# Patient Record
Sex: MALE | Race: BLACK OR AFRICAN AMERICAN
[De-identification: names, ages, dates, MRNs, and addresses within clinical notes are randomized per-mention and may not be internally consistent; named-entity substitution may affect disease eponyms.]

---

## 2019-06-14 ENCOUNTER — HOSPITAL ENCOUNTER (INPATIENT)
Dept: HOSPITAL 11 - JP.ED | Age: 64
LOS: 2 days | Discharge: HOME | DRG: 868 | End: 2019-06-16
Attending: INTERNAL MEDICINE | Admitting: INTERNAL MEDICINE
Payer: MEDICARE

## 2019-06-14 DIAGNOSIS — Z89.421: ICD-10-CM

## 2019-06-14 DIAGNOSIS — H54.7: ICD-10-CM

## 2019-06-14 DIAGNOSIS — G47.30: ICD-10-CM

## 2019-06-14 DIAGNOSIS — E78.00: ICD-10-CM

## 2019-06-14 DIAGNOSIS — E66.9: ICD-10-CM

## 2019-06-14 DIAGNOSIS — K21.9: ICD-10-CM

## 2019-06-14 DIAGNOSIS — A77.49: Primary | ICD-10-CM

## 2019-06-14 DIAGNOSIS — I48.92: ICD-10-CM

## 2019-06-14 DIAGNOSIS — M79.10: ICD-10-CM

## 2019-06-14 DIAGNOSIS — Z86.73: ICD-10-CM

## 2019-06-14 DIAGNOSIS — R53.1: ICD-10-CM

## 2019-06-14 DIAGNOSIS — Z79.82: ICD-10-CM

## 2019-06-14 DIAGNOSIS — I25.10: ICD-10-CM

## 2019-06-14 DIAGNOSIS — Z95.5: ICD-10-CM

## 2019-06-14 DIAGNOSIS — E74.39: ICD-10-CM

## 2019-06-14 DIAGNOSIS — R50.9: ICD-10-CM

## 2019-06-14 DIAGNOSIS — R51: ICD-10-CM

## 2019-06-14 RX ADMIN — INSULIN LISPRO SCH UNIT: 100 INJECTION, SOLUTION INTRAVENOUS; SUBCUTANEOUS at 22:09

## 2019-06-14 NOTE — PCM.HP
H&P History of Present Illness





- General


Date of Service: 06/14/19


Admit Problem/Dx: 


 Admission Diagnosis/Problem





Admission Diagnosis/Problem      Fever








Source of Information: Patient, Provider, RN Notes Reviewed


History Limitations: Reports: No Limitations





- History of Present Illness


Initial Comments - Free Text/Narative: 





Mr. Hill is a 64-year-old gentleman who was admitted through the emergency 

department with weakness headache and myalgias secondary to febrile illness. 

Has not felt well over the past 4 days when symptoms first started. Headache 

actually has improved somewhat over the last 24 hours. He continues to have 

fairly high temperature elevations in the range of 103-104 degrees, with 

persistent myalgias. He denies any other specific symptoms or localization of 

infection. He was camping last weekend and had several bug bites but denies any 

obvious tick bites or finding ticks on himself.





- Related Data


Allergies/Adverse Reactions: 


 Allergies











Allergy/AdvReac Type Severity Reaction Status Date / Time


 


No Known Allergies Allergy   Verified 06/14/19 16:04











Home Medications: 


 Home Meds





Aspirin 81 mg PO DAILY 06/14/19 [History]


Lisinopril 20 mg PO DAILY 06/14/19 [History]


Metoprolol Succinate [Toprol XL] 25 mg PO DAILY 06/14/19 [History]


Nitroglycerin 0.4 mg SL ASDIRECTED 06/14/19 [History]


Ticagrelor [Brilinta] 45 mg PO BID 06/14/19 [History]


atorvaSTATin [Lipitor] 20 mg PO BEDTIME 06/14/19 [History]











Past Medical History


HEENT History: Reports: Impaired Vision


Cardiovascular History: Reports: High Cholesterol, Hypertension, Stents


Respiratory History: Reports: Sleep Apnea, Other (See Below)


Other Respiratory History: CPAP


Musculoskeletal History: Reports: Amputation


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


Neurological History: Reports: TIA


Endocrine/Metabolic History: Reports: Obesity/BMI 30+





- Infectious Disease History


Infectious Disease History: Reports: Chicken Pox, Measles, Mumps





- Past Surgical History


Head Surgeries/Procedures: Reports: None


Cardiovascular Surgical History: Reports: None


Respiratory Surgical History: Reports: None


Endocrine Surgical History: Reports: None


Neurological Surgical History: Reports: None


Musculoskeletal Surgical History: Reports: None


Dermatological Surgical History: Reports: None





Social & Family History





- Tobacco Use


Smoking Status *Q: Never Smoker


Second Hand Smoke Exposure: No





- Caffeine Use


Caffeine Use: Reports: Coffee, Soda, Tea





- Recreational Drug Use


Recreational Drug Use: No





H&P Review of Systems





- Review of Systems:


Review Of Systems: See Below


General: Reports: Fever, Chills, Malaise, Weakness, Fatigue, Diaphoresis, 

Decreased Appetite


HEENT: Reports: Headaches.  Denies: Ear Pain, Eye Pain, Sinus Congestion, Sore 

Throat


Pulmonary: Reports: No Symptoms


Cardiovascular: Reports: No Symptoms


Gastrointestinal: Reports: No Symptoms


Genitourinary: Reports: No Symptoms


Musculoskeletal: Reports: Muscle Pain, Muscle Stiffness.  Denies: Back Pain, 

Joint Pain, Joint Swelling


Skin: Reports: No Symptoms


Psychiatric: Reports: No Symptoms


Neurological: Reports: Headache.  Denies: Confusion, Numbness, Paresthesia, 

Trouble Speaking, Difficulty Walking, Weakness


Hematologic/Lymphatic: Reports: No Symptoms


Immunologic: Reports: No Symptoms





Exam





- Exam


Exam: See Below





- Vital Signs


Vital Signs: 


 Last Vital Signs











Temp  101.4 F H  06/14/19 16:20


 


Pulse  86   06/14/19 17:10


 


Resp  18   06/14/19 17:10


 


BP  129/75   06/14/19 17:10


 


Pulse Ox  95   06/14/19 16:20











Weight: 254 lb 13.67 oz





- Exam


Quality Assessment: DVT Prophylaxis


General: Alert, Oriented, Cooperative, Mild Distress


HEENT: Conjunctiva Clear, Hearing Intact, Normal Nasal Septum, Posterior 

Pharynx Clear, Pupils Equal.  No: Mucosa Moist & Pink


Neck: Supple, Trachea Midline, +2 Carotid Pulse wo Bruit


Lungs: Clear to Auscultation, Normal Respiratory Effort


Cardiovascular: Regular Rate, Normal S1, Normal S2.  No: Systolic Murmur, 

Diastolic Murmur


GI/Abdominal Exam: Soft, Non-Tender, No Organomegaly, No Distention


Back Exam: Normal Inspection, Full Range of Motion


Extremities: Non-Tender, No Pedal Edema


Skin: Warm, Dry, Intact


Neurological: Cranial Nerves Intact, Strength Equal Bilateral, Normal Speech, 

Normal Tone, Sensation Intact.  No: Focal Deficit


Neuro Extensive - Mental Status: Alert, Oriented x3, Normal Mood/Affect, Normal 

Cognition, Memory Intact





- Patient Data


Lab Results Last 24 hrs: 


 Laboratory Results - last 24 hr











  06/14/19 06/14/19 06/14/19 Range/Units





  16:37 17:01 17:03 


 


WBC  7.4    (4.5-11.0)  K/uL


 


RBC  5.86    (4.30-5.90)  M/uL


 


Hgb  17.1 H    (12.0-15.0)  g/dL


 


Hct  51.8    (40.0-54.0)  %


 


MCV  88    (80-98)  fL


 


MCH  29    (27-31)  pg


 


MCHC  33    (32-36)  %


 


Plt Count  143 L    (150-400)  K/uL


 


Neut % (Auto)  89 H    (36-66)  %


 


Lymph % (Auto)  7 L    (24-44)  %


 


Mono % (Auto)  5    (2-6)  %


 


Eos % (Auto)  0 L    (2-4)  %


 


Baso % (Auto)  0    (0-1)  %


 


Sodium   135 L   (140-148)  mmol/L


 


Potassium   4.1   (3.6-5.2)  mmol/L


 


Chloride   101   (100-108)  mmol/L


 


Carbon Dioxide   25   (21-32)  mmol/L


 


Anion Gap   13.1   (5.0-14.0)  mmol/L


 


BUN   24 H   (7-18)  mg/dL


 


Creatinine   2.0 H   (0.8-1.3)  mg/dL


 


Est Cr Clr Drug Dosing   39.22   mL/min


 


Estimated GFR (MDRD)   41 L   (>60)  


 


Glucose   115 H   ()  mg/dL


 


Calcium   9.1   (8.5-10.1)  mg/dL


 


Total Bilirubin   1.6 H   (0.2-1.0)  mg/dL


 


AST   88 H   (15-37)  U/L


 


ALT   56   (12-78)  U/L


 


Alkaline Phosphatase   81   ()  U/L


 


Troponin I   0.037   (0.000-0.056)  ng/mL


 


C-Reactive Protein   6.97 H   (0.0-0.3)  mg/dL


 


Total Protein   7.9   (6.4-8.2)  g/dL


 


Albumin   3.1 L   (3.4-5.0)  g/dL


 


Globulin   4.8 H   (2.3-3.5)  g/dL


 


Albumin/Globulin Ratio   0.7 L   (1.2-2.2)  


 


Procalcitonin    0.69  ng/mL











Result Diagrams: 


 06/14/19 16:37





 06/14/19 17:01





*Q Meaningful Use (ADM)





- VTE Risk Assess *Q


Each Risk Factor Represents 1 Point: Obesity ( BMI > 25 kg/m2)


Total Score 1 Point Risk Factors: 1


Each Risk Factor Represents 2 Points: Age 60 - 74 Years


Total Score 2 Point Risk Factors: 2


Each Risk Factor Represents 3 Points: None


Total Score 3 Point Risk Factors: 0


Each Risk Factor Represents 5 Points: None


Total Score 5 Point Risk Factors: 0


Venous Thromboembolism Risk Factor Score *Q: 3


Problem List Initiated/Reviewed/Updated: Yes


Orders Last 24hrs: 


 Active Orders 24 hr











 Category Date Time Status


 


 Patient Status Manage Transfer [TRANSFER] Routine ADT  06/14/19 19:03 Active


 


 EKG Documentation Completion [RC] ASDIRECTED Care  06/14/19 16:37 Active


 


 CULTURE BLOOD [BC] Urgent Lab  06/14/19 16:45 Received


 


 CULTURE BLOOD [BC] Urgent Lab  06/14/19 16:50 Received


 


 HUMAN GRANULOCYTIC DAILY-HGE Urgent Lab  06/14/19 17:46 Received


 


 LYME, TOTAL AB TEST/REFLEX Routine Lab  06/14/19 17:46 Received


 


 UA W/MICROSCOPIC [URIN] Urgent Lab  06/14/19 17:38 Ordered


 


 Sodium Chloride 0.9% [Normal Saline] 1,000 ml Med  06/14/19 17:00 Active





 IV ASDIRECTED   


 


 Blood Culture x2 Reflex Set [OM.PC] Urgent Oth  06/14/19 16:37 Ordered


 


 Resuscitation Status Routine Resus Stat  06/14/19 19:05 Ordered


 


 EKG 12 Lead [EK] Routine Ther  06/14/19 16:37 Ordered








 Medication Orders





Sodium Chloride (Normal Saline)  1,000 mls @ 1,000 mls/hr IV ASDIRECTED VINEET


   Last Admin: 06/14/19 17:21  Dose: 1,000 mls/hr








Assessment/Plan Comment:: 





ASSESSMENT AND PLAN





FEBRILE ILLNESS-present for the past 4 days, no specific etiology identified on 

evaluation in the emergency department. Possible viral syndrome versus 

anaplasmosis. He was in the woods last weekend, but does not recall obvious 

tick bite. Headache has improved but he continues to experience significant 

myalgias as well as fever.


-Reassess labs in a.m.


-Blood cultures pending


-Urinalysis pending


-Tick serology pending


-IV antibiotic therapy; Zosyn and doxycycline, pending further results





ATRIAL FLUTTER WITH CONTROLLED VENTRICULAR RESPONSE-he denies any previous 

history of significant dysrhythmia


-Cardiac monitoring





HISTORY OF CORONARY ARTERY DISEASE-currently asymptomatic





HISTORY OF GLUCOSE INTOLERANCE


-4 times a day glucometers


-Low-dose sliding scale Humulog





MAINTENANCE ISSUES


-DVT prophylaxis; Lovenox 40 mg subcutaneous daily


-GI prophylaxis; not indicated


-Christina catheter; not indicated


-Nutrition; regular diet


-Nicotine dependence; not required





CODE STATUS-FULL CODE





ADMISSION STATUS-patient will be admitted to inpatient status, expect at least 

a 2 night hospital stay for evaluation and management of problems as outlined 

above.  At the time of this admission I do not reasonably expected evaluation 

and management of this problem will require more than a 96 hour hospital stay.





DISPOSITION-anticipate discharge to home after the hospital stay.





PRIMARY CARE PROVIDER-

## 2019-06-14 NOTE — CRLCR
INDICATION: Dyspnea



TECHNIQUE: Chest radiograph 2 views



COMPARISON: None



FINDINGS: 



Mediastinum: The mediastinum is normal in appearance. The heart silhouette 

is normal in size and morphology. 



Lung: Both lungs are unremarkable in appearance. No sign of pleural 

effusion seen. No pneumothorax is identified. 







IMPRESSION: 



1. No acute cardiopulmonary disease is seen. 



Dictated by: Valentin Casas MD @ 06/14/2019 18:12:04



(Electronically Signed)

## 2019-06-14 NOTE — EDM.PDOC
ED HPI GENERAL MEDICAL PROBLEM





- General


Chief Complaint: Neurological Problem


Stated Complaint: LOSS OF BALANCE


Time Seen by Provider: 06/14/19 16:25


Source of Information: Reports: Patient, Family


History Limitations: Reports: No Limitations





- History of Present Illness


INITIAL COMMENTS - FREE TEXT/NARRATIVE: 





64-year-old -American with fever, generalized malaise, headache 

intermittent, and weakness over the past 5-7 days. He is very nonspecific about 

his symptoms, just feels off balance and weak. One week ago they were camping 

in primitive conditions without running water. He does not remember a tick 

bite. He has generalized muscle aches but not significant, no joint pains, no 

erythema or rash that is new. He was being treated for a rash on his right hip 

by dermatology over the last several weeks. No cough or shortness of breath, 

denies a sore throat, his stomach is "a little upset" but no pain, no vomiting.


Onset: Gradual


Duration: Day(s): (Symptoms have been increasing over the last 7 days.)


Associated Symptoms: Reports: Headaches, Malaise, Weakness.  Denies: Confusion, 

Cough


Treatments PTA: Reports: Aspirin (Takes a low-dose aspirin daily)





- Related Data


 Allergies











Allergy/AdvReac Type Severity Reaction Status Date / Time


 


No Known Allergies Allergy   Verified 06/14/19 19:58











Home Meds: 


 Home Meds





Aspirin 81 mg PO DAILY 06/14/19 [History]


Lisinopril 20 mg PO DAILY 06/14/19 [History]


Nitroglycerin 0.4 mg SL ASDIRECTED 06/14/19 [History]


Ticagrelor [Brilinta] 90 mg PO BID 06/14/19 [History]


atorvaSTATin [Lipitor] 40 mg PO DAILY 06/14/19 [History]


Metoprolol Tartrate 12.5 mg PO BID 06/15/19 [History]


Triamcinolone Acetonide [Triamcinolone Acetonide 0.5%] 15 gm .XX BID 06/15/19 [

History]


amLODIPine [Norvasc] 10 mg PO DAILY 06/15/19 [History]











Past Medical History


HEENT History: Reports: Impaired Vision


Cardiovascular History: Reports: High Cholesterol, Hypertension, Stents


Respiratory History: Reports: Sleep Apnea, Other (See Below)


Other Respiratory History: CPAP


Musculoskeletal History: Reports: Amputation


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


Neurological History: Reports: TIA


Endocrine/Metabolic History: Reports: Obesity/BMI 30+





- Infectious Disease History


Infectious Disease History: Reports: Chicken Pox, Measles, Mumps





- Past Surgical History


Head Surgeries/Procedures: Reports: None


Cardiovascular Surgical History: Reports: None


Respiratory Surgical History: Reports: None


Endocrine Surgical History: Reports: None


Neurological Surgical History: Reports: None


Musculoskeletal Surgical History: Reports: None


Dermatological Surgical History: Reports: None





Social & Family History





- Tobacco Use


Smoking Status *Q: Never Smoker


Second Hand Smoke Exposure: No





- Caffeine Use


Caffeine Use: Reports: Coffee, Soda, Tea





- Recreational Drug Use


Recreational Drug Use: No





ED ROS GENERAL





- Review of Systems


Review Of Systems: See Below


Constitutional: Reports: Fever, Chills, Malaise, Weakness, Decreased Appetite


HEENT: Denies: Sinus Problem, Throat Pain, Vision Change


Respiratory: Denies: Shortness of Breath, Cough


Cardiovascular: Reports: Lightheadedness.  Denies: Chest Pain, Palpitations


GI/Abdominal: Reports: Decreased Appetite, Nausea.  Denies: Abdominal Pain, 

Diarrhea, Vomiting


: Reports: No Symptoms


Musculoskeletal: Reports: Muscle Pain (Mild diffuse muscle aches, no focal pain)


Skin: Reports: Other (Melanotic macular rash on the right hip)


Neurological: Reports: Dizziness, Headache


Psychiatric: Reports: No Symptoms





ED EXAM, GENERAL





- Physical Exam


Exam: See Below


Exam Limited By: No Limitations


General Appearance: Alert, No Apparent Distress, Other (Patient appears tired 

but not significantly ill. Mental status is normal, answering questions 

appropriately)


Eye Exam: Right Eye: Foreign Body, Bilateral Eye: EOMI


Throat/Mouth: Normal Inspection


Head: Atraumatic


Neck: Supple, Other (Mild soreness with range of motion but no focal tenderness 

or meningismus)


Respiratory/Chest: No Respiratory Distress, Lungs Clear.  No: Rhonchi, Wheezing


Cardiovascular: Regular Rate, Rhythm


GI/Abdominal: Soft, Non-Tender, No Mass


Extremities: Normal Inspection.  No: Pedal Edema


Neurological: Alert, Oriented, No Motor/Sensory Deficits


Psychiatric: Normal Affect, Normal Mood


Skin Exam: Warm, Dry, Other (An area of hyper melanotic "rash" on the right 

lateral hip)





Course





- Vital Signs


Last Recorded V/S: 


 Last Vital Signs











Temp  95.9 F   06/16/19 07:00


 


Pulse  88   06/16/19 07:00


 


Resp  16   06/16/19 07:00


 


BP  98/76   06/16/19 07:00


 


Pulse Ox  98   06/16/19 07:00














- Orders/Labs/Meds


Orders: 


 Medication Orders





Acetaminophen (Tylenol)  650 mg PO Q4H PRN


   PRN Reason: Pain (Mild 1-3)/fever


Aspirin (Aspirin)  81 mg PO DAILY [facility]


   Last Admin: 06/15/19 09:44  Dose: 81 mg


Atorvastatin Calcium (Lipitor)  40 mg PO DAILY [facility]


   Last Admin: 06/15/19 10:02  Dose: 40 mg


Calcium Carbonate/Glycine (Tums)  1,000 mg PO Q2H PRN


   PRN Reason: Indigestion


   Last Admin: 06/15/19 23:12  Dose: 1,000 mg


Dextrose (Glutose 15)  15 gm PO ONETIME PRN


   PRN Reason: Hypoglycemia


Dextrose/Water (Dextrose 50% In Water)  50 ml IV ONETIME PRN


   PRN Reason: Hypoglycemia


Enoxaparin Sodium (Lovenox)  40 mg SUBCUT BEDTIME [facility]


   Last Admin: 06/15/19 21:20  Dose: 40 mg


Doxycycline Hyclate 100 mg/ (Sodium Chloride)  100 mls @ 100 mls/hr IV Q12H [facility]


   Last Admin: 06/15/19 21:20  Dose: 100 mls/hr


   Admin: 06/15/19 09:46  Dose: 100 mls/hr


Insulin Human Lispro (Humalog)  0 unit SUBCUT QIDACANDBED [facility]; Protocol


   Last Admin: 06/15/19 21:20 Dose:  Not Given


   Admin: 06/15/19 17:17 Dose:  Not Given


   Admin: 06/15/19 12:47 Dose:  Not Given


   Admin: 06/15/19 07:57 Dose:  Not Given


   Admin: 06/14/19 22:09  Dose: 1 unit


Lisinopril (Prinivil)  20 mg PO DAILY [facility]


   Last Admin: 06/15/19 09:44  Dose: 20 mg


Lorazepam (Ativan)  0.5 mg IVPUSH Q2H PRN


   PRN Reason: Nausea


   Last Admin: 06/15/19 21:36  Dose: 0.5 mg


   Admin: 06/15/19 06:36  Dose: 0.5 mg


   Admin: 06/15/19 00:05  Dose: 0.5 mg


Magnesium Oxide (Magnesium Oxide)  400 mg PO BID [facility]


   Last Admin: 06/15/19 21:21  Dose: 400 mg


   Admin: 06/15/19 09:45  Dose: 400 mg


Metoprolol Tartrate (Lopressor)  12.5 mg PO BID [facility]


   Last Admin: 06/15/19 21:21  Dose: 12.5 mg


   Admin: 06/15/19 10:01  Dose: 12.5 mg


Ondansetron HCl (Zofran)  4 mg IV Q4H PRN


   PRN Reason: Nausea/Vomiting


   Last Admin: 06/14/19 22:14  Dose: 4 mg


Oxycodone HCl (Oxycodone)  10 mg PO Q4H PRN


   PRN Reason: Pain (moderate 4-6)


Polyethylene Glycol (Miralax)  17 gm PO DAILY PRN


   PRN Reason: Constipation


Sodium Chloride (Saline Flush)  10 ml FLUSH ASDIRECTED PRN


   PRN Reason: Keep Vein Open


Ticagrelor (Brilinta)  90 mg PO BID [facility]


   Last Admin: 06/15/19 21:20  Dose: 90 mg


   Admin: 06/15/19 10:01  Dose: 90 mg








Labs: 


 Laboratory Tests











  06/14/19 06/14/19 06/14/19 Range/Units





  16:37 17:01 17:03 


 


WBC  7.4    (4.5-11.0)  K/uL


 


RBC  5.86    (4.30-5.90)  M/uL


 


Hgb  17.1 H    (12.0-15.0)  g/dL


 


Hct  51.8    (40.0-54.0)  %


 


MCV  88    (80-98)  fL


 


MCH  29    (27-31)  pg


 


MCHC  33    (32-36)  %


 


Plt Count  143 L    (150-400)  K/uL


 


Neut % (Auto)  89 H    (36-66)  %


 


Lymph % (Auto)  7 L    (24-44)  %


 


Mono % (Auto)  5    (2-6)  %


 


Eos % (Auto)  0 L    (2-4)  %


 


Baso % (Auto)  0    (0-1)  %


 


Sodium   135 L   (140-148)  mmol/L


 


Potassium   4.1   (3.6-5.2)  mmol/L


 


Chloride   101   (100-108)  mmol/L


 


Carbon Dioxide   25   (21-32)  mmol/L


 


Anion Gap   13.1   (5.0-14.0)  mmol/L


 


BUN   24 H   (7-18)  mg/dL


 


Creatinine   2.0 H   (0.8-1.3)  mg/dL


 


Est Cr Clr Drug Dosing   39.22   mL/min


 


Estimated GFR (MDRD)   41 L   (>60)  


 


Glucose   115 H   ()  mg/dL


 


Calcium   9.1   (8.5-10.1)  mg/dL


 


Total Bilirubin   1.6 H   (0.2-1.0)  mg/dL


 


AST   88 H   (15-37)  U/L


 


ALT   56   (12-78)  U/L


 


Alkaline Phosphatase   81   ()  U/L


 


Troponin I   0.037   (0.000-0.056)  ng/mL


 


C-Reactive Protein   6.97 H   (0.0-0.3)  mg/dL


 


Total Protein   7.9   (6.4-8.2)  g/dL


 


Albumin   3.1 L   (3.4-5.0)  g/dL


 


Globulin   4.8 H   (2.3-3.5)  g/dL


 


Albumin/Globulin Ratio   0.7 L   (1.2-2.2)  


 


Procalcitonin    0.69  ng/mL











Meds: 


Medications











Generic Name Dose Route Start Last Admin





  Trade Name Freq  PRN Reason Stop Dose Admin


 


Acetaminophen  650 mg  06/14/19 19:39  





  Tylenol  PO   





  Q4H PRN   





  Pain (Mild 1-3)/fever   





     





     





     


 


Aspirin  81 mg  06/15/19 09:00  06/15/19 09:44





  Aspirin  PO   81 mg





  DAILY [facility]   Administration





     





     





     





     


 


Atorvastatin Calcium  40 mg  06/15/19 09:30  06/15/19 10:02





  Lipitor  PO   40 mg





  DAILY [facility]   Administration





     





     





     





     


 


Calcium Carbonate/Glycine  1,000 mg  06/15/19 22:58  06/15/19 23:12





  Tums  PO   1,000 mg





  Q2H PRN   Administration





  Indigestion   





     





     





     


 


Dextrose  15 gm  06/14/19 19:39  





  Glutose 15  PO   





  ONETIME PRN   





  Hypoglycemia   





     





     





     


 


Dextrose/Water  50 ml  06/14/19 19:39  





  Dextrose 50% In Water  IV   





  ONETIME PRN   





  Hypoglycemia   





     





     





     


 


Enoxaparin Sodium  40 mg  06/15/19 21:00  06/15/19 21:20





  Lovenox  SUBCUT   40 mg





  BEDTIME [facility]   Administration





     





     





     





     


 


Doxycycline Hyclate 100 mg/  100 mls @ 100 mls/hr  06/15/19 09:00  06/15/19 21:

20





  Sodium Chloride  IV   100 mls/hr





  Q12H [facility]   Administration





     





     





     





     


 


Insulin Human Lispro  0 unit  06/14/19 20:00  06/15/19 21:20





  Humalog  SUBCUT   Not Given





  QIDACANDBED [facility]   





     





     





  Protocol   





     


 


Lisinopril  20 mg  06/15/19 09:00  06/15/19 09:44





  Prinivil  PO   20 mg





  DAILY [facility]   Administration





     





     





     





     


 


Lorazepam  0.5 mg  06/14/19 23:42  06/15/19 21:36





  Ativan  IVPUSH   0.5 mg





  Q2H PRN   Administration





  Nausea   





     





     





     


 


Magnesium Oxide  400 mg  06/15/19 09:00  06/15/19 21:21





  Magnesium Oxide  PO   400 mg





  BID [facility]   Administration





     





     





     





     


 


Metoprolol Tartrate  12.5 mg  06/15/19 09:30  06/15/19 21:21





  Lopressor  PO   12.5 mg





  BID [facility]   Administration





     





     





     





     


 


Ondansetron HCl  4 mg  06/14/19 19:39  06/14/19 22:14





  Zofran  IV   4 mg





  Q4H PRN   Administration





  Nausea/Vomiting   





     





     





     


 


Oxycodone HCl  10 mg  06/14/19 19:39  





  Oxycodone  PO   





  Q4H PRN   





  Pain (moderate 4-6)   





     





     





     


 


Polyethylene Glycol  17 gm  06/14/19 19:39  





  Miralax  PO   





  DAILY PRN   





  Constipation   





     





     





     


 


Sodium Chloride  10 ml  06/14/19 19:39  





  Saline Flush  FLUSH   





  ASDIRECTED PRN   





  Keep Vein Open   





     





     





     


 


Ticagrelor  90 mg  06/15/19 09:30  06/15/19 21:20





  Brilinta  PO   90 mg





  BID [facility]   Administration





     





     





     





     














Discontinued Medications














Generic Name Dose Route Start Last Admin





  Trade Name Freq  PRN Reason Stop Dose Admin


 


Acetaminophen  1,000 mg  06/14/19 16:38  06/14/19 16:42





  Tylenol Extra Strength  PO  06/14/19 16:39  1,000 mg





  ONETIME ONE   Administration





     





     





     





     


 


Atorvastatin Calcium  20 mg  06/14/19 21:00  06/14/19 22:20





  Lipitor  PO   Not Given





  BEDTIME [facility]   





     





     





     





     


 


Enoxaparin Sodium  40 mg  06/14/19 19:39  06/14/19 22:09





  Lovenox  SUBCUT   40 mg





  DAILY [facility]   Administration





     





     





     





     


 


Sodium Chloride  1,000 mls @ 1,000 mls/hr  06/14/19 17:00  06/14/19 17:21





  Normal Saline  IV   1,000 mls/hr





  ASDIRECTED [facility]   Administration





     





     





     





     


 


Doxycycline Hyclate 100 mg/  100 mls @ 100 mls/hr  06/14/19 17:34  06/14/19 18:

17





  Sodium Chloride  IV  06/14/19 18:33  100 mls/hr





  ONETIME ONE   Administration





     





     





     





     


 


Piperacillin Sod/Tazobactam  100 mls @ 100 mls/hr  06/14/19 17:33  06/14/19 18:

14





  Sod 4.5 gm/ Sodium Chloride  IV  06/14/19 18:32  100 mls/hr





  ONETIME ONE   Administration





     





     





     





     


 


Lactated Ringer's  1,000 mls @ 125 mls/hr  06/14/19 19:39  06/15/19 05:35





  Ringers, Lactated  IV   125 mls/hr





  ASDIRECTED [facility]   Administration





     





     





     





     


 


Piperacillin Sod/Tazobactam  50 mls @ 100 mls/hr  06/15/19 00:00  06/15/19 05:35





  Sod 2.25 gm/ Sodium Chloride  IV   100 mls/hr





  Q6H [facility]   Administration





     





     





     





     


 


Piperacillin/Tazobactam/  50 mls @ 100 mls/hr  06/15/19 12:00  06/15/19 17:16





  Dextrose 3.375 gm/ Premix  IV   100 mls/hr





  Q6H [facility]   Administration





     





     





     





     


 


Magnesium Sulfate 2 gm/ Premix  50 mls @ 25 mls/hr  06/15/19 09:00  06/15/19 09:

48





  IV  06/15/19 10:59  25 mls/hr





  ONETIME ONE   Administration





     





     





     





     


 


Metoprolol Succinate  25 mg  06/15/19 09:00  





  Toprol Xl  PO   





  DAILY [facility]   





     





     





     





     


 


Metoprolol Succinate  25 mg  06/14/19 21:45  06/14/19 22:12





  Toprol Xl  PO   25 mg





  BID [facility]   Administration





     





     





     





     


 


Ondansetron HCl  4 mg  06/14/19 18:04  06/14/19 18:13





  Zofran  IVPUSH  06/14/19 18:05  4 mg





  ONETIME ONE   Administration





     





     





     





     


 


Ticagrelor  45 mg  06/14/19 21:00  06/14/19 22:11





  Brilinta  PO   45 mg





  BID [facility]   Administration





     





     





     





     














- Re-Assessments/Exams


Free Text/Narrative Re-Assessment/Exam: 





06/14/19 18:00


An IV was started and the patient was given normal saline 1 L bolus. CBC, CMP, 

CRP and pro-calcitonin were obtained as well as blood cultures. An EKG 

confirmed atrial flutter. A troponin was also drawn. Two-view chest x-ray and 

UA was obtained.


06/14/19 18:11


CBC returned with a white count is 7400, hemoglobin 17.1 platelet count 143,000 

and neutrophil percent was 89%. Sodium 135 potassium 4.1 carbon dioxide 25 and 

gap 13.1 BUN and creatinine were elevated at 24 and 2.0 is actively. GFR was 

also low at 41. Patient has no history of renal disease that he knows of. Total 

bilirubin was elevated at 1.6 and AST was mildly elevated at 88, ALT and 

alkaline phosphatase were normal. Troponin 0.037. CRP was significantly 

elevated at 6.97, pro-calcitonin also elevated at 0.69. Patient presents with 

infection of unknown etiology, two-view chest x-ray obtained was negative. 

After a liter of fluid the patient was still having difficulty obtaining a 

urine so an additional liter was given. Discussed his case with Dr. Bowie of 

the hospitalist service, patient was given 4.5 g of Zosyn and 100 mg of IV 

doxycycline, Lyme's disease and ehrlichiosis panels were added to the workup. 

Patient developed some nausea and was given 4 mg of Zofran.





Departure





- Departure


Time of Disposition: 18:50


Disposition: Admitted As Inpatient 65


Clinical Impression: 


Fever


Qualifiers:


 Fever type: unspecified Qualified Code(s): R50.9 - Fever, unspecified








- Discharge Information

## 2019-06-15 RX ADMIN — INSULIN LISPRO SCH: 100 INJECTION, SOLUTION INTRAVENOUS; SUBCUTANEOUS at 07:57

## 2019-06-15 RX ADMIN — LORAZEPAM PRN MG: 2 INJECTION INTRAMUSCULAR; INTRAVENOUS at 21:36

## 2019-06-15 RX ADMIN — TAZOBACTAM SODIUM AND PIPERACILLIN SODIUM SCH MLS/HR: 375; 3 INJECTION, SOLUTION INTRAVENOUS at 12:15

## 2019-06-15 RX ADMIN — LORAZEPAM PRN MG: 2 INJECTION INTRAMUSCULAR; INTRAVENOUS at 00:05

## 2019-06-15 RX ADMIN — INSULIN LISPRO SCH: 100 INJECTION, SOLUTION INTRAVENOUS; SUBCUTANEOUS at 12:47

## 2019-06-15 RX ADMIN — INSULIN LISPRO SCH: 100 INJECTION, SOLUTION INTRAVENOUS; SUBCUTANEOUS at 21:20

## 2019-06-15 RX ADMIN — INSULIN LISPRO SCH: 100 INJECTION, SOLUTION INTRAVENOUS; SUBCUTANEOUS at 17:17

## 2019-06-15 RX ADMIN — TAZOBACTAM SODIUM AND PIPERACILLIN SODIUM SCH MLS/HR: 375; 3 INJECTION, SOLUTION INTRAVENOUS at 17:16

## 2019-06-15 RX ADMIN — LORAZEPAM PRN MG: 2 INJECTION INTRAMUSCULAR; INTRAVENOUS at 06:36

## 2019-06-15 NOTE — PCM.PN
- General Info


Date of Service: 06/15/19


Subjective Update: 





Mr. Hill has noted good improvement in symptoms since admission. Headache 

is totally resolved and he's not had much in the way of fevers. He did 

experience nausea and vomiting last night but that also appears to be 

significantly improved, he was able to tolerate his breakfast without 

significant symptoms. Energy level and appetite seem to be improved as well. 


Functional Status: Reports: Tolerating Diet, Ambulating, Urinating





- Review of Systems


General: Reports: Fever, Weakness, Chills


Pulmonary: Reports: No Symptoms


Cardiovascular: Reports: No Symptoms


Gastrointestinal: Reports: No Symptoms





- Patient Data


Vitals - Most Recent: 


 Last Vital Signs











Temp  97.4 F   06/15/19 14:45


 


Pulse  69   06/15/19 14:45


 


Resp  16   06/15/19 14:45


 


BP  117/75   06/15/19 14:45


 


Pulse Ox  98   06/15/19 14:45











Weight - Most Recent: 255 lb 3.2 oz


I&O - Last 24 Hours: 


 Intake & Output











 06/14/19 06/15/19 06/15/19





 22:59 06:59 14:59


 


Intake Total 300 1424 1260


 


Output Total  500 


 


Balance 











Lab Results Last 24 Hours: 


 Laboratory Results - last 24 hr











  06/14/19 06/14/19 06/14/19 Range/Units





  16:37 17:01 17:03 


 


WBC  7.4    (4.5-11.0)  K/uL


 


RBC  5.86    (4.30-5.90)  M/uL


 


Hgb  17.1 H    (12.0-15.0)  g/dL


 


Hct  51.8    (40.0-54.0)  %


 


MCV  88    (80-98)  fL


 


MCH  29    (27-31)  pg


 


MCHC  33    (32-36)  %


 


Plt Count  143 L    (150-400)  K/uL


 


Neut % (Auto)  89 H    (36-66)  %


 


Lymph % (Auto)  7 L    (24-44)  %


 


Mono % (Auto)  5    (2-6)  %


 


Eos % (Auto)  0 L    (2-4)  %


 


Baso % (Auto)  0    (0-1)  %


 


Sodium   135 L   (140-148)  mmol/L


 


Potassium   4.1   (3.6-5.2)  mmol/L


 


Chloride   101   (100-108)  mmol/L


 


Carbon Dioxide   25   (21-32)  mmol/L


 


Anion Gap   13.1   (5.0-14.0)  mmol/L


 


BUN   24 H   (7-18)  mg/dL


 


Creatinine   2.0 H   (0.8-1.3)  mg/dL


 


Est Cr Clr Drug Dosing   39.22   mL/min


 


Estimated GFR (MDRD)   41 L   (>60)  


 


Glucose   115 H   ()  mg/dL


 


Calcium   9.1   (8.5-10.1)  mg/dL


 


Magnesium     (1.8-2.4)  mg/dL


 


Total Bilirubin   1.6 H   (0.2-1.0)  mg/dL


 


AST   88 H   (15-37)  U/L


 


ALT   56   (12-78)  U/L


 


Alkaline Phosphatase   81   ()  U/L


 


Troponin I   0.037   (0.000-0.056)  ng/mL


 


C-Reactive Protein   6.97 H   (0.0-0.3)  mg/dL


 


Total Protein   7.9   (6.4-8.2)  g/dL


 


Albumin   3.1 L   (3.4-5.0)  g/dL


 


Globulin   4.8 H   (2.3-3.5)  g/dL


 


Albumin/Globulin Ratio   0.7 L   (1.2-2.2)  


 


Procalcitonin    0.69  ng/mL


 


Urine Color     


 


Urine Appearance     


 


Urine pH     (4.5-8.0)  


 


Ur Specific Gravity     (1.008-1.030)  


 


Urine Protein     (NEGATIVE)  mg/dL


 


Urine Glucose (UA)     (NEGATIVE)  mg/dL


 


Urine Ketones     (NEGATIVE)  mg/dL


 


Urine Occult Blood     (NEGATIVE)  


 


Urine Nitrite     (NEGAITVE)  


 


Urine Bilirubin     (NEGATIVE)  


 


Urine Urobilinogen     (NORMAL)  mg/dL


 


Ur Leukocyte Esterase     (NEGATIVE)  














  06/14/19 06/15/19 06/15/19 Range/Units





  22:26 05:49 05:49 


 


WBC   5.8   (4.5-11.0)  K/uL


 


RBC   5.26   (4.30-5.90)  M/uL


 


Hgb   15.5 H   (12.0-15.0)  g/dL


 


Hct   46.9   (40.0-54.0)  %


 


MCV   89   (80-98)  fL


 


MCH   30   (27-31)  pg


 


MCHC   33   (32-36)  %


 


Plt Count   108 L   (150-400)  K/uL


 


Neut % (Auto)   82 H   (36-66)  %


 


Lymph % (Auto)   11 L   (24-44)  %


 


Mono % (Auto)   7 H   (2-6)  %


 


Eos % (Auto)   0 L   (2-4)  %


 


Baso % (Auto)   0   (0-1)  %


 


Sodium    137 L  (140-148)  mmol/L


 


Potassium    3.9  (3.6-5.2)  mmol/L


 


Chloride    104  (100-108)  mmol/L


 


Carbon Dioxide    25  (21-32)  mmol/L


 


Anion Gap    11.9  (5.0-14.0)  mmol/L


 


BUN    25 H  (7-18)  mg/dL


 


Creatinine    1.8 H  (0.8-1.3)  mg/dL


 


Est Cr Clr Drug Dosing    43.58  mL/min


 


Estimated GFR (MDRD)    46 L  (>60)  


 


Glucose    109 H  ()  mg/dL


 


Calcium    8.6  (8.5-10.1)  mg/dL


 


Magnesium    1.7 L  (1.8-2.4)  mg/dL


 


Total Bilirubin    1.3 H  (0.2-1.0)  mg/dL


 


AST    112 H  (15-37)  U/L


 


ALT    69  (12-78)  U/L


 


Alkaline Phosphatase    67  ()  U/L


 


Troponin I     (0.000-0.056)  ng/mL


 


C-Reactive Protein     (0.0-0.3)  mg/dL


 


Total Protein    6.7  (6.4-8.2)  g/dL


 


Albumin    2.5 L  (3.4-5.0)  g/dL


 


Globulin    4.2 H  (2.3-3.5)  g/dL


 


Albumin/Globulin Ratio    0.6 L  (1.2-2.2)  


 


Procalcitonin     ng/mL


 


Urine Color      


 


Urine Appearance  Cloudy    


 


Urine pH  5.0    (4.5-8.0)  


 


Ur Specific Gravity  1.020    (1.008-1.030)  


 


Urine Protein  Negative    (NEGATIVE)  mg/dL


 


Urine Glucose (UA)  Normal    (NEGATIVE)  mg/dL


 


Urine Ketones  Negative    (NEGATIVE)  mg/dL


 


Urine Occult Blood  Moderate    (NEGATIVE)  


 


Urine Nitrite  Positive H    (NEGAITVE)  


 


Urine Bilirubin  Negative    (NEGATIVE)  


 


Urine Urobilinogen  Normal    (NORMAL)  mg/dL


 


Ur Leukocyte Esterase  Large    (NEGATIVE)  











[facility] Results Last 24 Hours: 


 Microbiology











 06/15/19 12:18 Clostridioides difficile (PCR) - Final





 Stool / Feces    NEGATIVE CDIFF TOXIN











Med Orders - Current: 


 Current Medications





Acetaminophen (Tylenol)  650 mg PO Q4H PRN


   PRN Reason: Pain (Mild 1-3)/fever


Aspirin (Aspirin)  81 mg PO DAILY [facility]


   Last Admin: 06/15/19 09:44 Dose:  81 mg


Atorvastatin Calcium (Lipitor)  40 mg PO DAILY [facility]


   Last Admin: 06/15/19 10:02 Dose:  40 mg


Dextrose (Glutose 15)  15 gm PO ONETIME PRN


   PRN Reason: Hypoglycemia


Dextrose/Water (Dextrose 50% In Water)  50 ml IV ONETIME PRN


   PRN Reason: Hypoglycemia


Enoxaparin Sodium (Lovenox)  40 mg SUBCUT BEDTIME [facility]


Doxycycline Hyclate 100 mg/ (Sodium Chloride)  100 mls @ 100 mls/hr IV Q12H [facility]


   Last Admin: 06/15/19 09:46 Dose:  100 mls/hr


Piperacillin/Tazobactam/ (Dextrose 3.375 gm/ Premix)  50 mls @ 100 mls/hr IV 

Q6H [facility]


   Last Admin: 06/15/19 12:15 Dose:  100 mls/hr


Insulin Human Lispro (Humalog)  0 unit SUBCUT QIDACANDBED [facility]; Protocol


   Last Admin: 06/15/19 12:47 Dose:  Not Given


Lisinopril (Prinivil)  20 mg PO DAILY [facility]


   Last Admin: 06/15/19 09:44 Dose:  20 mg


Lorazepam (Ativan)  0.5 mg IVPUSH Q2H PRN


   PRN Reason: Nausea


   Last Admin: 06/15/19 06:36 Dose:  0.5 mg


Magnesium Oxide (Magnesium Oxide)  400 mg PO BID [facility]


   Last Admin: 06/15/19 09:45 Dose:  400 mg


Metoprolol Tartrate (Lopressor)  12.5 mg PO BID [facility]


   Last Admin: 06/15/19 10:01 Dose:  12.5 mg


Ondansetron HCl (Zofran)  4 mg IV Q4H PRN


   PRN Reason: Nausea/Vomiting


   Last Admin: 06/14/19 22:14 Dose:  4 mg


Oxycodone HCl (Oxycodone)  10 mg PO Q4H PRN


   PRN Reason: Pain (moderate 4-6)


Polyethylene Glycol (Miralax)  17 gm PO DAILY PRN


   PRN Reason: Constipation


Sodium Chloride (Saline Flush)  10 ml FLUSH ASDIRECTED PRN


   PRN Reason: Keep Vein Open


Ticagrelor (Brilinta)  90 mg PO BID [facility]


   Last Admin: 06/15/19 10:01 Dose:  90 mg





Discontinued Medications





Acetaminophen (Tylenol Extra Strength)  1,000 mg PO ONETIME ONE


   Stop: 06/14/19 16:39


   Last Admin: 06/14/19 16:42 Dose:  1,000 mg


Atorvastatin Calcium (Lipitor)  20 mg PO BEDTIME [facility]


   Last Admin: 06/14/19 22:20 Dose:  Not Given


Enoxaparin Sodium (Lovenox)  40 mg SUBCUT DAILY [facility]


   Last Admin: 06/14/19 22:09 Dose:  40 mg


Sodium Chloride (Normal Saline)  1,000 mls @ 1,000 mls/hr IV ASDIRECTED [facility]


   Last Admin: 06/14/19 17:21 Dose:  1,000 mls/hr


Doxycycline Hyclate 100 mg/ (Sodium Chloride)  100 mls @ 100 mls/hr IV ONETIME 

ONE


   Stop: 06/14/19 18:33


   Last Admin: 06/14/19 18:17 Dose:  100 mls/hr


Piperacillin Sod/Tazobactam (Sod 4.5 gm/ Sodium Chloride)  100 mls @ 100 mls/hr 

IV ONETIME ONE


   Stop: 06/14/19 18:32


   Last Admin: 06/14/19 18:14 Dose:  100 mls/hr


Lactated Ringer's (Ringers, Lactated)  1,000 mls @ 125 mls/hr IV ASDIRECTED [facility]


   Last Admin: 06/15/19 05:35 Dose:  125 mls/hr


Piperacillin Sod/Tazobactam (Sod 2.25 gm/ Sodium Chloride)  50 mls @ 100 mls/hr 

IV Q6H [facility]


   Last Admin: 06/15/19 05:35 Dose:  100 mls/hr


Magnesium Sulfate 2 gm/ Premix  50 mls @ 25 mls/hr IV ONETIME ONE


   Stop: 06/15/19 10:59


   Last Admin: 06/15/19 09:48 Dose:  25 mls/hr


Metoprolol Succinate (Toprol Xl)  25 mg PO DAILY [facility]


Metoprolol Succinate (Toprol Xl)  25 mg PO BID [facility]


   Last Admin: 06/14/19 22:12 Dose:  25 mg


Ondansetron HCl (Zofran)  4 mg IVPUSH ONETIME ONE


   Stop: 06/14/19 18:05


   Last Admin: 06/14/19 18:13 Dose:  4 mg


Ticagrelor (Brilinta)  45 mg PO BID [facility]


   Last Admin: 06/14/19 22:11 Dose:  45 mg











- Exam


Quality Assessment: DVT Prophylaxis


General: Alert, Oriented, Cooperative, Mild Distress


Lungs: Clear to Auscultation, Normal Respiratory Effort


Cardiovascular: Regular Rate, Regular Rhythm, No Murmurs


GI/Abdominal Exam: Soft, Non-Tender, No Organomegaly, No Distention


Extremities: Non-Tender, No Pedal Edema





- Problem List Review


Problem List Initiated/Reviewed/Updated: Yes





- My Orders


Last 24 Hours: 


My Active Orders





06/14/19 19:05


Resuscitation Status Routine 





06/14/19 19:39


Patient Status [ADT] Routine 


Ambulate [RC] QID 


Blood Glucose Check, Bedside [RC] QIDACANDBED 


Cardiac Monitoring [RC] .As Directed 


Communication Order [RC] STAT 


Diabetes Education [RC] Click to Edit 


Height and Weight [RC] DAILY 


Intake and Output [RC] QSHIFT 


Notify Provider Vital Signs [RC] ASDIRECTED 


Notify Provider [RC] PRN 


Oxygen Therapy [RC] PRN 


Peripheral IV Care [RC] Q12H 


Up ad Jud [RC] ASDIRECTED 


Up to Chair [RC] QID 


VTE/DVT Education [RC] Per Unit Routine 


Vital Signs [RC] Q4H 


Acetaminophen [Tylenol]   650 mg PO Q4H PRN 


Dextrose 50% in Water   50 ml IV ONETIME PRN 


Dextrose [Glutose 15]   15 gm PO ONETIME PRN 


Ondansetron [Zofran]   4 mg IV Q4H PRN 


Polyethylene Glycol 3350 [MiraLAX]   17 gm PO DAILY PRN 


Sodium Chloride 0.9% [Saline Flush]   10 ml FLUSH ASDIRECTED PRN 


oxyCODONE   10 mg PO Q4H PRN 


Peripheral IV Insertion Adult [OM.PC] Routine 





06/14/19 20:00


Insulin Lispro [HumaLOG]   See Protocol  SUBCUT QIDACANDBED 





06/14/19 23:42


LORazepam [Ativan]   0.5 mg IVPUSH Q2H PRN 





06/14/19 Dinner


Consistent Carbohydrate Diet [DIET] 





06/15/19 09:00


Aspirin   81 mg PO DAILY 


Doxycycline [Vibramycin] 100 mg   Sodium Chloride 0.9% [Normal Saline] 100 ml 

IV Q12H 


Lisinopril [Prinivil]   20 mg PO DAILY 


Magnesium Oxide   400 mg PO BID 





06/15/19 09:30


Metoprolol Tartrate [Lopressor]   12.5 mg PO BID 


Ticagrelor [Brilinta]   90 mg PO BID 


atorvaSTATin [Lipitor]   40 mg PO DAILY 





06/15/19 09:55


Isolation [COMM] Stat 





06/15/19 12:00


Piperacillin/Tazobactam/Dext [Zosyn in Dextrose Iso-Osmotic 3.375 GM] 3.375 gm 

  Premix Bag 1 bag IV Q6H 





06/15/19 14:56


Convert IV to Saline Lock [OM.PC] Routine 





06/15/19 16:30


GLUCOSE POC LAB TO COLLECT [POC] QIDACANDBED 





06/15/19 21:00


GLUCOSE POC LAB TO COLLECT [POC] QIDACANDBED 


Enoxaparin [Lovenox]   40 mg SUBCUT BEDTIME 





06/16/19 05:00


CBC WITH AUTO DIFF [HEME] Timed 


COMPREHENSIVE METABOLIC PN,CMP [CHEM] Timed 


MAGNESIUM [CHEM] Timed 





06/16/19 07:30


GLUCOSE POC LAB TO COLLECT [POC] QIDACANDBED 





06/16/19 11:30


GLUCOSE POC LAB TO COLLECT [POC] QIDACANDBED 





06/16/19 16:30


GLUCOSE POC LAB TO COLLECT [POC] QIDACANDBED 





06/16/19 21:00


GLUCOSE POC LAB TO COLLECT [POC] QIDACANDBED 





06/17/19 07:30


GLUCOSE POC LAB TO COLLECT [POC] QIDACANDBED 





06/17/19 11:30


GLUCOSE POC LAB TO COLLECT [POC] QIDACANDBED 





06/17/19 16:30


GLUCOSE POC LAB TO COLLECT [POC] QIDACANDBED 





06/17/19 21:00


GLUCOSE POC LAB TO COLLECT [POC] QIDACANDBED 





06/18/19 07:30


GLUCOSE POC LAB TO COLLECT [POC] QIDACANDBED 





06/18/19 11:30


GLUCOSE POC LAB TO COLLECT [POC] QIDACANDBED 





06/18/19 16:30


GLUCOSE POC LAB TO COLLECT [POC] QIDACANDBED 





06/18/19 21:00


GLUCOSE POC LAB TO COLLECT [POC] QIDACANDBED 





06/19/19 07:30


GLUCOSE POC LAB TO COLLECT [POC] QIDACANDBED 





06/19/19 11:30


GLUCOSE POC LAB TO COLLECT [POC] QIDACANDBED 





06/19/19 16:30


GLUCOSE POC LAB TO COLLECT [POC] QIDACANDBED 














- Plan


Plan:: 





ASSESSMENT AND PLAN





ANAPLASMOSIS-good improvement since admission, headache, nausea, and vomiting 

have totally resolved. Vital signs are remained stable and he has been afebrile.


-Blood cultures pending


-Urinalysis pending


-Tick serology pending


-IV antibiotic therapy; doxycycline, pending further results





ATRIAL FLUTTER WITH CONTROLLED VENTRICULAR RESPONSE-he denies any previous 

history of significant dysrhythmia


-Cardiac monitoring





HISTORY OF CORONARY ARTERY DISEASE-currently asymptomatic





HISTORY OF GLUCOSE INTOLERANCE


-4 times a day glucometers


-Low-dose sliding scale Humulog





MAINTENANCE ISSUES


-DVT prophylaxis; Lovenox 40 mg subcutaneous daily


-GI prophylaxis; not indicated


-Christina catheter; not indicated


-Nutrition; regular diet


-Nicotine dependence; not required





CODE STATUS-FULL CODE





ADMISSION STATUS-patient will be admitted to inpatient status, expect at least 

a 2 night hospital stay for evaluation and management of problems as outlined 

above.  At the time of this admission I do not reasonably expected evaluation 

and management of this problem will require more than a 96 hour hospital stay.





DISPOSITION-anticipate discharge to home after the hospital stay.





PRIMARY CARE PROVIDER-

## 2019-06-16 RX ADMIN — INSULIN LISPRO SCH: 100 INJECTION, SOLUTION INTRAVENOUS; SUBCUTANEOUS at 10:05

## 2019-06-16 RX ADMIN — INSULIN LISPRO SCH: 100 INJECTION, SOLUTION INTRAVENOUS; SUBCUTANEOUS at 12:37

## 2019-06-16 RX ADMIN — INSULIN LISPRO SCH: 100 INJECTION, SOLUTION INTRAVENOUS; SUBCUTANEOUS at 16:25
